# Patient Record
Sex: MALE | Race: WHITE
[De-identification: names, ages, dates, MRNs, and addresses within clinical notes are randomized per-mention and may not be internally consistent; named-entity substitution may affect disease eponyms.]

---

## 2017-01-02 ENCOUNTER — HOSPITAL ENCOUNTER (OUTPATIENT)
Dept: HOSPITAL 58 - RAD | Age: 57
Discharge: HOME | End: 2017-01-02
Attending: NURSE PRACTITIONER

## 2017-01-02 VITALS — BODY MASS INDEX: 26.6 KG/M2

## 2017-01-02 DIAGNOSIS — M54.2: Primary | ICD-10-CM

## 2017-01-02 DIAGNOSIS — E78.2: ICD-10-CM

## 2017-01-02 DIAGNOSIS — Z86.79: ICD-10-CM

## 2017-01-02 DIAGNOSIS — Z12.5: ICD-10-CM

## 2017-01-02 DIAGNOSIS — E04.2: ICD-10-CM

## 2017-01-02 LAB
ALBUMIN SERPL-MCNC: 3.9 G/DL (ref 3.4–5)
ALBUMIN/GLOB SERPL: 1.03 {RATIO}
ALP SERPL-CCNC: 94 U/L (ref 50–136)
ALT SERPL-CCNC: 35 U/L (ref 12–78)
ANION GAP SERPL CALC-SCNC: 14.8 MMOL/L
AST SERPL-CCNC: 21 U/L (ref 15–37)
BASOPHILS # BLD AUTO: 0 K/UL (ref 0–0.2)
BASOPHILS NFR BLD AUTO: 0.4 % (ref 0–3)
BILIRUB SERPL-MCNC: 0.34 MG/DL (ref 0–1.2)
BUN SERPL-MCNC: 8 MG/DL (ref 7–18)
BUN/CREAT SERPL: 9.3
CALCIUM SERPL-MCNC: 9.9 MG/DL (ref 8.2–10.2)
CHLORIDE SERPL-SCNC: 104 MMOL/L (ref 98–107)
CHOLEST SERPL-MCNC: 192 MG/DL (ref 0–200)
CHOLEST/HDLC SERPL: 5.6 {RATIO} (ref 4.5–6.4)
CO2 BLD-SCNC: 26 MMOL/L (ref 21–32)
CREAT SERPL-MCNC: 0.86 MG/DL (ref 0.6–1.1)
EOSINOPHIL # BLD AUTO: 0.4 K/UL (ref 0–0.7)
EOSINOPHIL NFR BLD AUTO: 4.8 % (ref 0–7)
GFR SERPLBLD BASED ON 1.73 SQ M-ARVRAT: 91 ML/MIN
GLOBULIN SER CALC-MCNC: 3.8 G/L
GLUCOSE SERPL-MCNC: 102 MG/DL (ref 70–100)
HCT VFR BLD AUTO: 39.7 % (ref 42–52)
HDLC SERPL-MCNC: 34 MG/DL (ref 35–60)
HGB BLD-MCNC: 13.8 G/DL (ref 14–18)
IMM GRANULOCYTES NFR BLD AUTO: 0.2 % (ref 0–5)
LYMPHOCYTES # SPEC AUTO: 2.8 K/UL (ref 0.6–3.4)
LYMPHOCYTES NFR BLD AUTO: 31.8 % (ref 10–50)
MCH RBC QN: 33.1 PG (ref 27–31)
MCHC RBC AUTO-ENTMCNC: 34.8 G/DL (ref 31.8–35.4)
MCV RBC: 95.2 FL (ref 80–94)
MONOCYTES # BLD AUTO: 0.7 K/UL (ref 0.4–2)
MONOCYTES NFR BLD AUTO: 8.2 % (ref 0–10)
NEUTROPHILS # BLD AUTO: 4.9 K/UL (ref 2–6.9)
NEUTROPHILS NFR BLD AUTO: 54.6 %
PLATELET # BLD AUTO: 350 10^3/UL (ref 140–440)
POTASSIUM SERPL-SCNC: 3.8 MMOL/L (ref 3.5–5.1)
PROT SERPL-MCNC: 7.7 G/DL (ref 6.4–8.2)
RBC # BLD AUTO: 4.17 10^6/UL (ref 4.7–6.1)
SODIUM SERPL-SCNC: 141 MMOL/L (ref 136–145)
TRIGL SERPL-MCNC: 266 MG/DL (ref 30–150)
VLDLC SERPL CALC-MCNC: 53 MG/DL (ref 2–30)
WBC # BLD AUTO: 8.91 K/UL (ref 4.2–10.2)

## 2017-01-02 PROCEDURE — 84443 ASSAY THYROID STIM HORMONE: CPT

## 2017-01-02 PROCEDURE — 80061 LIPID PANEL: CPT

## 2017-01-02 PROCEDURE — 80053 COMPREHEN METABOLIC PANEL: CPT

## 2017-01-02 PROCEDURE — 85025 COMPLETE CBC W/AUTO DIFF WBC: CPT

## 2017-01-02 PROCEDURE — 36415 COLL VENOUS BLD VENIPUNCTURE: CPT

## 2017-01-02 NOTE — US
EXAM:  Carotid ultrasound 

  

HISTORY:  Cervicalgia 

  

COMPARISON:  None 

  

TECHNIQUE:  Carotid ultrasound was performed using gray scale, color, and Doppler imaging was perfor
med. 

  

FINDINGS: 

Right carotid:  There is atherosclerotic plaque in the common carotid artery and bulb/proximal inter
nal carotid artery.  Peak systolic velocity measurement in the right internal carotid artery is 0.8 
meters per second.  End-diastolic velocity measurement in the right internal carotid artery is 0.3 m
eters per second.  Right internal to common carotid artery peak systolic velocity ratio is 1.1.  Zeus
w in the right vertebral artery is antegrade. 

  

Left carotid:  There is atherosclerotic plaque in the common carotid artery and bulb/proximal intern
al carotid artery.  Peak systolic velocity measurement in the left internal carotid artery is 0.6 me
ters per second.  End-diastolic velocity measurement in the left internal carotid artery is 0.3 mete
rs per second.  Left internal to common carotid artery peak systolic velocity ratio measures 0.7.  F
low in the left vertebral artery is antegrade. 

  

IMPRESSION: 

1. Right internal carotid: Mild (less than 50%) stenosis 

2. Left internal carotid: Mild (less than 50%) stenosis

## 2017-04-13 DIAGNOSIS — I65.23 BILATERAL CAROTID ARTERY STENOSIS: Primary | ICD-10-CM

## 2017-05-10 ENCOUNTER — OFFICE VISIT (OUTPATIENT)
Dept: VASCULAR SURGERY | Facility: CLINIC | Age: 57
End: 2017-05-10

## 2017-05-10 ENCOUNTER — HOSPITAL ENCOUNTER (OUTPATIENT)
Dept: ULTRASOUND IMAGING | Facility: HOSPITAL | Age: 57
Discharge: HOME | End: 2017-05-10
Admitting: NURSE PRACTITIONER

## 2017-05-10 VITALS
WEIGHT: 177 LBS | HEART RATE: 82 BPM | BODY MASS INDEX: 27.78 KG/M2 | SYSTOLIC BLOOD PRESSURE: 162 MMHG | DIASTOLIC BLOOD PRESSURE: 98 MMHG | HEIGHT: 67 IN

## 2017-05-10 DIAGNOSIS — I65.23 BILATERAL CAROTID ARTERY STENOSIS: ICD-10-CM

## 2017-05-10 DIAGNOSIS — I65.23 BILATERAL CAROTID ARTERY STENOSIS: Primary | ICD-10-CM

## 2017-05-10 PROCEDURE — 93880 EXTRACRANIAL BILAT STUDY: CPT | Performed by: SURGERY

## 2017-05-10 PROCEDURE — 99214 OFFICE O/P EST MOD 30 MIN: CPT | Performed by: NURSE PRACTITIONER

## 2017-05-10 PROCEDURE — 93880 EXTRACRANIAL BILAT STUDY: CPT

## 2017-05-10 RX ORDER — MELOXICAM 15 MG/1
TABLET ORAL
Refills: 1 | COMMUNITY
Start: 2017-03-30

## 2017-05-15 ENCOUNTER — HOSPITAL ENCOUNTER (OUTPATIENT)
Dept: HOSPITAL 58 - LAB | Age: 57
Discharge: HOME | End: 2017-05-15
Attending: SPECIALIST

## 2017-05-15 VITALS — BODY MASS INDEX: 26.6 KG/M2

## 2017-05-15 DIAGNOSIS — E04.2: Primary | ICD-10-CM

## 2017-05-15 LAB
ALBUMIN SERPL-MCNC: 3.5 G/DL (ref 3.4–5)
ALBUMIN/GLOB SERPL: 0.92 {RATIO}
ALP SERPL-CCNC: 73 U/L (ref 50–136)
ALT SERPL-CCNC: 32 U/L (ref 12–78)
ANION GAP SERPL CALC-SCNC: 13.3 MMOL/L
AST SERPL-CCNC: 20 U/L (ref 15–37)
BASOPHILS # BLD AUTO: 0.1 K/UL (ref 0–0.2)
BASOPHILS NFR BLD AUTO: 0.8 % (ref 0–3)
BILIRUB SERPL-MCNC: 0.27 MG/DL (ref 0–1.2)
BUN SERPL-MCNC: 14 MG/DL (ref 7–18)
BUN/CREAT SERPL: 19.44
CALCIUM SERPL-MCNC: 8.9 MG/DL (ref 8.2–10.2)
CHLORIDE SERPL-SCNC: 109 MMOL/L (ref 98–107)
CHOLEST SERPL-MCNC: 215 MG/DL (ref 0–200)
CHOLEST/HDLC SERPL: 6.7 {RATIO} (ref 4.5–6.4)
CO2 BLD-SCNC: 22 MMOL/L (ref 21–32)
CREAT SERPL-MCNC: 0.72 MG/DL (ref 0.6–1.1)
EOSINOPHIL # BLD AUTO: 0.4 K/UL (ref 0–0.7)
EOSINOPHIL NFR BLD AUTO: 4.1 % (ref 0–7)
GFR SERPLBLD BASED ON 1.73 SQ M-ARVRAT: 112 ML/MIN
GLOBULIN SER CALC-MCNC: 3.8 G/L
GLUCOSE SERPL-MCNC: 86 MG/DL (ref 70–100)
HCT VFR BLD AUTO: 39.9 % (ref 42–52)
HDLC SERPL-MCNC: 32 MG/DL (ref 35–60)
HGB BLD-MCNC: 14 G/DL (ref 14–18)
IMM GRANULOCYTES NFR BLD AUTO: 0.6 % (ref 0–5)
LYMPHOCYTES # SPEC AUTO: 2.4 K/UL (ref 0.6–3.4)
LYMPHOCYTES NFR BLD AUTO: 27.3 % (ref 10–50)
MCH RBC QN: 33.3 PG (ref 27–31)
MCHC RBC AUTO-ENTMCNC: 35.1 G/DL (ref 31.8–35.4)
MCV RBC: 94.8 FL (ref 80–94)
MONOCYTES # BLD AUTO: 0.7 K/UL (ref 0.4–2)
MONOCYTES NFR BLD AUTO: 7.5 % (ref 0–10)
NEUTROPHILS # BLD AUTO: 5.2 K/UL (ref 2–6.9)
NEUTROPHILS NFR BLD AUTO: 59.7 %
PLATELET # BLD AUTO: 426 10^3/UL (ref 140–440)
POTASSIUM SERPL-SCNC: 3.3 MMOL/L (ref 3.5–5.1)
PROT SERPL-MCNC: 7.3 G/DL (ref 6.4–8.2)
RBC # BLD AUTO: 4.21 10^6/UL (ref 4.7–6.1)
SODIUM SERPL-SCNC: 141 MMOL/L (ref 136–145)
TRIGL SERPL-MCNC: 415 MG/DL (ref 30–150)
WBC # BLD AUTO: 8.63 K/UL (ref 4.2–10.2)

## 2017-05-15 PROCEDURE — 80053 COMPREHEN METABOLIC PANEL: CPT

## 2017-05-15 PROCEDURE — 84439 ASSAY OF FREE THYROXINE: CPT

## 2017-05-15 PROCEDURE — 80061 LIPID PANEL: CPT

## 2017-05-15 PROCEDURE — 36415 COLL VENOUS BLD VENIPUNCTURE: CPT

## 2017-05-15 PROCEDURE — 84443 ASSAY THYROID STIM HORMONE: CPT

## 2017-05-15 PROCEDURE — 85025 COMPLETE CBC W/AUTO DIFF WBC: CPT

## 2017-06-19 ENCOUNTER — HOSPITAL ENCOUNTER (OUTPATIENT)
Dept: HOSPITAL 58 - RAD | Age: 57
Discharge: HOME | End: 2017-06-19
Attending: PAIN MEDICINE

## 2017-06-19 VITALS — BODY MASS INDEX: 26.6 KG/M2

## 2017-06-19 DIAGNOSIS — M47.816: Primary | ICD-10-CM

## 2017-06-19 DIAGNOSIS — M48.06: ICD-10-CM

## 2017-06-19 DIAGNOSIS — M51.36: ICD-10-CM

## 2017-06-19 NOTE — MRI
EXAM:  MRI lumbar spine without IV contrast.    DATE: 06/19/2017. 

  

HISTORY:  Facet joint arthropathy, spinal stenosis. 

  

TECHNIQUE:  Sagittal and axial T1W and T2W sequences of the lumbar spine along with sagittal IR and 
coronal T2W sequences were obtained using 1.2 Rosa Elena magnet.  No IV contrast. 

  

COMPARISON:  MRI L-spine 2 March 2015. 

  

FINDINGS:  There are five non-rib-bearing lumbar vertebra.  Twelfth ribs appear somewhat small.  The
re is no lumbar scoliosis.  No acute lumbar fracture, subluxation, osseous malignancy, or pars inter
articularis defect is demonstrated.  Lumbar vertebra are normal in height.  Chronic Schmorl's nodes 
are identified at T10, T11, T12, and L1.  Bone marrow signal is overall normal, except for significa
nt degenerative changes at T11-12.  Prominent osteophytes are noted at T11-12 and T12-L1, with small
er osteophytes at several lumbar levels.  Mild L3-4, minor L4-5, and moderate L5-S1 disc space narro
wing is detected.  No sacral fracture or stress reaction is evident.  Mild SI joint arthritis (left 
greater than right) is observed.  A T2W bright, T1W dark, 7.9 x 4.7 x 5.5 mm focus at the left naina
idline S3 level is likely a Tarlov cyst.  Conus medullaris terminates at T12-L1.  Visible spinal cor
d is normal. 

  

No retroperitoneal lymphadenopathy, paraspinal mass, or aortic aneurysm is identified.  Paraspinal m
usculature is symmetric bilaterally.  Visible portions of the liver, spleen, right adrenal gland and
 right kidney are unremarkable.  A subtle T2W bright area in the anterolateral cortex midzone left k
idney may be volume averaging with the renal sinus complex or this cyst, but is not fully visualized
.  The left adrenal body appears somewhat nodular; however, this may be breathing motion artifact.  
No bowel obstruction or neoplasm is evident.  A few sigmoid colon diverticuli are suspected. 

  

Segmental analysis: 

  

T11-12:  Sagittal images reveal minor posterior disc bulge without cord compression or central steno
sis.  Each foramen is patent. 

  

T12-L1:  Minimal posterior disc bulge does not cause conus compression, central stenosis or foramina
l stenosis. 

  

L1-2:  Minimal midline right paracentral disc bulge does not cause central stenosis or foraminal oni
nosis. 

  

L2-3:  Minimal concentric disc bulge causes minor bilateral inferior foraminal encroachment.  No gabi
tral canal stenosis. 

  

L3-4:  Small concentric disc bulge, mild right facet arthropathy, moderate left facet arthropathy, a
nd mild ligamentum flavum hypertrophy cause mild central canal stenosis and minor narrowing at the o
pening to each foramen. 

  

L4-5:  Minimal concentric disc bulge, mild facet arthropathy, and mild ligamentum flavum hypertrophy
 cause mild bilateral foraminal stenoses.  No central canal stenosis. Each L4 nerve root appears to 
contact the disc bulge near the lateral margin of the foramen. 

  

L5-S1:  Minimal posterior disc bulge and mild left facet arthropathy cause slight left foraminal alyssia
rowing.  No central canal stenosis. 

  

  

IMPRESSIONS: 

  

1.  Thoracolumbar mild spondylosis, mild/moderate facet arthropathy, and multilevel DDD - - similar 
to March 2015. 

2.  Multilevel foraminal narrowing (minor/mild).  Each L4 nerve root contacts the disc bulge near th
e foramen, and could be sources for pain/radiculopathy. 

3.  Mild central canal stenosis at L3-4. 

4.  T-L-spine chronic Schmorl's nodes. 

5.  Probable Tarlov cyst at S3 canal level. 

6.  Minor sigmoid colon diverticulosis. 

7.  Left adrenal artifact vs small lesion (7.8 mm).  If this is a true lesion, it is statistically l
ikely to be a benign adenoma.

## 2017-07-11 ENCOUNTER — OFFICE VISIT (OUTPATIENT)
Dept: NEUROSURGERY | Facility: CLINIC | Age: 57
End: 2017-07-11

## 2017-07-11 VITALS
DIASTOLIC BLOOD PRESSURE: 75 MMHG | BODY MASS INDEX: 28.25 KG/M2 | WEIGHT: 180 LBS | SYSTOLIC BLOOD PRESSURE: 110 MMHG | HEIGHT: 67 IN

## 2017-07-11 DIAGNOSIS — M48.061 SPINAL STENOSIS, LUMBAR REGION, WITHOUT NEUROGENIC CLAUDICATION: Primary | ICD-10-CM

## 2017-07-11 DIAGNOSIS — Z78.9 NONSMOKER: ICD-10-CM

## 2017-07-11 DIAGNOSIS — E66.3 OVERWEIGHT: ICD-10-CM

## 2017-07-11 PROCEDURE — 99204 OFFICE O/P NEW MOD 45 MIN: CPT | Performed by: NURSE PRACTITIONER

## 2017-07-11 RX ORDER — LISINOPRIL 10 MG/1
TABLET ORAL
Refills: 6 | COMMUNITY
Start: 2017-06-13

## 2017-07-11 RX ORDER — METHYLPREDNISOLONE 4 MG/1
TABLET ORAL
Qty: 21 EACH | Refills: 0 | Status: SHIPPED | OUTPATIENT
Start: 2017-07-11 | End: 2017-09-07

## 2017-07-11 RX ORDER — ATORVASTATIN CALCIUM 20 MG/1
TABLET, FILM COATED ORAL
Refills: 6 | COMMUNITY
Start: 2017-05-15 | End: 2017-07-11

## 2017-07-11 RX ORDER — ATORVASTATIN CALCIUM 40 MG/1
TABLET, FILM COATED ORAL
Refills: 6 | COMMUNITY
Start: 2017-05-17

## 2017-07-11 RX ORDER — BUPROPION HYDROCHLORIDE 100 MG/1
TABLET, EXTENDED RELEASE ORAL
Refills: 3 | COMMUNITY
Start: 2017-06-14

## 2017-07-11 NOTE — PROGRESS NOTES
Chief complaint:   Chief Complaint   Patient presents with   • Back Pain     Patient has back pain with right leg pain that goes down to his knee, Jos has not tried any physical therapy for his back pain.        Subjective     HPI: This is a 56-year-old male gentleman who is referred to us for low back pain and right lower extremity pain.  He is here to be evaluated today.  He states that the pain in his back is been going on for several years but it did get significantly worse about a year and a half ago.  There is no accident or injury associated with this.  He states the pain in his back is constant.  It is a dull aching pain.  He says he can get some relief when he standing or laying down.  He says sitting will make his pain worse.  He will have pain that radiates down into his right lower extremity in a posterior radicular fashion down to about his knee.  He says this pain is intermittent.  Its better if he can leaning get the pressure off of his leg in its worse when he sitting down.  He denies any bowel or bladder issues.  He has not done any dedicated course of physical therapy, chiropractic care, or pain management injections.  He rates the pain on a scale 0-10 at a 4.  He says it is interfering with his activities of daily living.  He currently is taking anti-inflammatory medication.  He is currently retired.    Review of Systems   Eyes:        Wears glasses   Cardiovascular: Positive for chest pain.   Musculoskeletal: Positive for back pain, joint swelling and neck pain.   Neurological: Positive for headaches.   Psychiatric/Behavioral: The patient is nervous/anxious.    All other systems reviewed and are negative.       Past Medical History:   Diagnosis Date   • Subclavian steal syndrome      Past Surgical History:   Procedure Laterality Date   • CAROTID SUBCLAVIAN BYPASS     • CERVICAL FUSION     • HYDROCELE EXCISION / REPAIR     • NECK SURGERY       Family History   Problem Relation Age of Onset  "  • Stroke Other    • Heart disease Other    • Heart disease Mother    • Hypertension Mother    • Stroke Father    • No Known Problems Sister    • No Known Problems Brother    • No Known Problems Sister      Social History   Substance Use Topics   • Smoking status: Former Smoker     Quit date: 11/22/2015   • Smokeless tobacco: Never Used   • Alcohol use No       (Not in a hospital admission)  Allergies:  Review of patient's allergies indicates no known allergies.    Objective      Vital Signs  /75 (BP Location: Right arm, Patient Position: Sitting)  Ht 67\" (170.2 cm)  Wt 180 lb (81.6 kg)  BMI 28.19 kg/m2    Physical Exam   Constitutional: He is oriented to person, place, and time. He appears well-developed and well-nourished.   HENT:   Head: Normocephalic.   Eyes: Conjunctivae, EOM and lids are normal. Pupils are equal, round, and reactive to light.   Neck: Normal range of motion.   Cardiovascular: Normal rate, regular rhythm and normal heart sounds.    Pulmonary/Chest: Effort normal and breath sounds normal.   Abdominal: Normal appearance.   Musculoskeletal: Normal range of motion.        Lumbar back: He exhibits pain.   Neurological: He is alert and oriented to person, place, and time. He has normal strength and normal reflexes. He displays normal reflexes. No cranial nerve deficit or sensory deficit. GCS eye subscore is 4. GCS verbal subscore is 5. GCS motor subscore is 6.   Skin: Skin is warm.   Psychiatric: He has a normal mood and affect. His speech is normal and behavior is normal. Thought content normal. Cognition and memory are normal.       Results Review: MRI lumbar spine shows the patient does have disc bulging at L3-4.  He does have bilateral foraminal narrowing at this level to him mild-to-moderate degree as well as at L4-5.  No malalignment visualized.  Mild disc degeneration noted.  No Modic endplate changes.  No fracture visualized.          Assessment/Plan: At this point were in get the " patient started into a dedicated course of physical therapy consisting of 2-3 times a week for 4-6 weeks.  In addition of that I am is start him on a Medrol Dosepak to see this will help with the pain that he is experiencing.  We will follow-up with him in 6-8 weeks which time we will see Dr. Le.  Should he not have any improvement from the therapy and steroids he may be a good candidate for injections.  BMI shows that is overweight.  BMI chart was given the patient.  He is a nonsmoker.      Jos was seen today for back pain.    Diagnoses and all orders for this visit:    Spinal stenosis, lumbar region, without neurogenic claudication  -     Ambulatory Referral to Physical Therapy Evaluate and treat    Overweight    Nonsmoker    Other orders  -     SCANNED - IMAGING  -     MethylPREDNISolone (MEDROL, DIAMOND,) 4 MG tablet; Take as directed on package instructions.          I discussed the patients findings and my recommendations with patient    Jose Brown, BONITA  07/11/17  12:17 PM

## 2017-09-06 ENCOUNTER — TELEPHONE (OUTPATIENT)
Dept: NEUROSURGERY | Facility: CLINIC | Age: 57
End: 2017-09-06

## 2017-09-06 NOTE — TELEPHONE ENCOUNTER
Neither # in the system has a voicemail set up and no one answered at either #.    carrie reynoso CMA      Patient called back & confirmed his appt for tomorrow/carrie reynoso CMA

## 2017-09-07 ENCOUNTER — OFFICE VISIT (OUTPATIENT)
Dept: NEUROSURGERY | Facility: CLINIC | Age: 57
End: 2017-09-07

## 2017-09-07 VITALS
HEIGHT: 67 IN | BODY MASS INDEX: 28.25 KG/M2 | DIASTOLIC BLOOD PRESSURE: 76 MMHG | WEIGHT: 180 LBS | SYSTOLIC BLOOD PRESSURE: 124 MMHG

## 2017-09-07 DIAGNOSIS — M48.061 SPINAL STENOSIS, LUMBAR REGION, WITHOUT NEUROGENIC CLAUDICATION: Primary | ICD-10-CM

## 2017-09-07 DIAGNOSIS — Z78.9 NONSMOKER: ICD-10-CM

## 2017-09-07 PROBLEM — E66.3 OVERWEIGHT: Status: RESOLVED | Noted: 2017-07-11 | Resolved: 2017-09-07

## 2017-09-07 PROCEDURE — 99213 OFFICE O/P EST LOW 20 MIN: CPT | Performed by: NEUROLOGICAL SURGERY

## 2017-09-07 NOTE — PROGRESS NOTES
SUBJECTIVE:  Patient ID: Jos Pfeiffer is a 56 y.o. male is here today for follow-up.    Chief Complaint: Back pain  Chief Complaint   Patient presents with   • back & leg pain     patient has completed 8 PT sessions without any significant relief of his symptoms       HPI  56-year-old gentleman with a long-term history of back pain.  He has been working with [facility] pain management for a few years now.  He complains of back pain and some bilateral hip pain.  It is claudicate in nature.  He did 8 sessions of therapy with out any improvement.  He is managing his pain with Norco 3 times a day.  He has had no pain management injections.    The following portions of the patient's history were reviewed and updated as appropriate: allergies, current medications, past family history, past medical history, past social history, past surgical history and problem list.    OBJECTIVE:    Review of Systems   Musculoskeletal: Positive for back pain.   All other systems reviewed and are negative.         Physical Exam   Constitutional: He is oriented to person, place, and time. He appears well-developed and well-nourished.   HENT:   Head: Normocephalic and atraumatic.   Right Ear: Hearing normal.   Left Ear: Hearing normal.   Eyes: EOM are normal. Pupils are equal, round, and reactive to light.   Neck: Normal range of motion.   Neurological: He is alert and oriented to person, place, and time. He has normal strength and normal reflexes. No cranial nerve deficit or sensory deficit. He displays a negative Romberg sign. GCS eye subscore is 4. GCS verbal subscore is 5. GCS motor subscore is 6. He displays no Babinski's sign on the right side. He displays no Babinski's sign on the left side.   Psychiatric: His speech is normal. Judgment normal. Cognition and memory are normal.       Neurologic Exam     Mental Status   Oriented to person, place, and time.   Speech: speech is normal     Cranial Nerves     CN III, IV, VI   Pupils are  equal, round, and reactive to light.  Extraocular motions are normal.     Motor Exam     Strength   Strength 5/5 throughout.       Independent Review of Radiographic Studies:   MRI of the lumbar spine demonstrates only mild degenerative changes with no significant neuroforaminal compromise or compression.  ASSESSMENT/PLAN:  The patient does not require any sort of surgical intervention in his low back.  I encouraged him to continue work with [facility] and consider starting a series of epidural injections.      1. Spinal stenosis, lumbar region, without neurogenic claudication    2. BMI 28.0-28.9,adult    3. Nonsmoker            No Follow-up on file.      Rogelio Le MD

## 2017-09-07 NOTE — PATIENT INSTRUCTIONS

## 2017-11-14 ENCOUNTER — HOSPITAL ENCOUNTER (OUTPATIENT)
Dept: HOSPITAL 58 - LAB | Age: 57
Discharge: HOME | End: 2017-11-14
Attending: NURSE PRACTITIONER

## 2017-11-14 VITALS — BODY MASS INDEX: 26.6 KG/M2

## 2017-11-14 DIAGNOSIS — E78.2: ICD-10-CM

## 2017-11-14 DIAGNOSIS — M54.9: Primary | ICD-10-CM

## 2017-11-14 DIAGNOSIS — G89.29: ICD-10-CM

## 2017-11-14 LAB
ALBUMIN SERPL-MCNC: 3.6 G/DL (ref 3.4–5)
ALBUMIN/GLOB SERPL: 0.92 {RATIO}
ALP SERPL-CCNC: 107 U/L (ref 50–136)
ALT SERPL-CCNC: 32 U/L (ref 12–78)
ANION GAP SERPL CALC-SCNC: 11.6 MMOL/L
AST SERPL-CCNC: 22 U/L (ref 15–37)
BASOPHILS # BLD AUTO: 0.1 K/UL (ref 0–0.2)
BASOPHILS NFR BLD AUTO: 0.6 % (ref 0–3)
BILIRUB SERPL-MCNC: 0.42 MG/DL (ref 0–1.2)
BUN SERPL-MCNC: 7 MG/DL (ref 7–18)
BUN/CREAT SERPL: 8.75
CALCIUM SERPL-MCNC: 9.7 MG/DL (ref 8.2–10.2)
CHLORIDE SERPL-SCNC: 106 MMOL/L (ref 98–107)
CHOLEST SERPL-MCNC: 161 MG/DL (ref 0–200)
CHOLEST/HDLC SERPL: 4.2 {RATIO} (ref 4.5–6.4)
CO2 BLD-SCNC: 25 MMOL/L (ref 21–32)
CREAT SERPL-MCNC: 0.8 MG/DL (ref 0.6–1.1)
EOSINOPHIL # BLD AUTO: 0.3 K/UL (ref 0–0.7)
EOSINOPHIL NFR BLD AUTO: 3.3 % (ref 0–7)
GFR SERPLBLD BASED ON 1.73 SQ M-ARVRAT: 99 ML/MIN
GLOBULIN SER CALC-MCNC: 3.9 G/L
GLUCOSE SERPL-MCNC: 108 MG/DL (ref 70–100)
HCT VFR BLD AUTO: 37.7 % (ref 42–52)
HDLC SERPL-MCNC: 38 MG/DL (ref 35–60)
HGB BLD-MCNC: 13.6 G/DL (ref 14–18)
IMM GRANULOCYTES NFR BLD AUTO: 0.3 % (ref 0–5)
LYMPHOCYTES # SPEC AUTO: 2.1 K/UL (ref 0.6–3.4)
LYMPHOCYTES NFR BLD AUTO: 23.1 % (ref 10–50)
MCH RBC QN: 33.8 PG (ref 27–31)
MCHC RBC AUTO-ENTMCNC: 36.1 G/DL (ref 31.8–35.4)
MCV RBC: 93.8 FL (ref 80–94)
MONOCYTES # BLD AUTO: 0.7 K/UL (ref 0.4–2)
MONOCYTES NFR BLD AUTO: 7.7 % (ref 0–10)
NEUTROPHILS # BLD AUTO: 5.8 K/UL (ref 2–6.9)
NEUTROPHILS NFR BLD AUTO: 65 %
PLATELET # BLD AUTO: 326 10^3/UL (ref 140–440)
POTASSIUM SERPL-SCNC: 3.6 MMOL/L (ref 3.5–5.1)
PROT SERPL-MCNC: 7.5 G/DL (ref 6.4–8.2)
RBC # BLD AUTO: 4.02 10^6/UL (ref 4.7–6.1)
SODIUM SERPL-SCNC: 139 MMOL/L (ref 136–145)
TRIGL SERPL-MCNC: 183 MG/DL (ref 30–150)
VLDLC SERPL CALC-MCNC: 37 MG/DL (ref 2–30)
WBC # BLD AUTO: 8.92 K/UL (ref 4.2–10.2)

## 2017-11-14 PROCEDURE — 80053 COMPREHEN METABOLIC PANEL: CPT

## 2017-11-14 PROCEDURE — 36415 COLL VENOUS BLD VENIPUNCTURE: CPT

## 2017-11-14 PROCEDURE — 85025 COMPLETE CBC W/AUTO DIFF WBC: CPT

## 2017-11-14 PROCEDURE — 80061 LIPID PANEL: CPT

## 2017-12-06 ENCOUNTER — HOSPITAL ENCOUNTER (OUTPATIENT)
Dept: HOSPITAL 58 - RAD | Age: 57
Discharge: HOME | End: 2017-12-06
Attending: NURSE PRACTITIONER

## 2017-12-06 VITALS — BODY MASS INDEX: 26.6 KG/M2

## 2017-12-06 DIAGNOSIS — R06.02: ICD-10-CM

## 2017-12-06 DIAGNOSIS — R07.89: Primary | ICD-10-CM

## 2017-12-06 NOTE — CT
EXAM:  CT of the chest with and without contrast 

  

History:  Chest wall discomfort, short of breath 

  

Comparison:  Chest radiograph 03/17/2016, chest CT 01/21/2016 

  

Technique:  Multiplanar CT images through the thorax were obtained with and without the administratio
n of IV contrast 

  

Findings: Heart size is normal.  Coronary calcifications.  No pericardial effusion.  No pathologicall
y enlarged thoracic lymph nodes.  Great vessels are unremarkable.  No consolidation.  No pleural flui
d and no pneumothorax. Dependent atelectasis.  No suspicious lung masses or lung nodules. 

  

Within the visualized upper abdomen, no acute findings.  Stable partial calcification of the right ad
renal gland most compatible with old trauma. No acute osseous abnormalities.  Moderate to severe dege
nerative disc disease at T11-T12. 

  

Impression: 

1.  No acute intrathoracic process. 

2.  Coronary artery disease. 

3.  Moderate to severe degenerative disc disease at T11-T12

## 2017-12-11 ENCOUNTER — HOSPITAL ENCOUNTER (OUTPATIENT)
Dept: HOSPITAL 58 - CAR | Age: 57
Discharge: HOME | End: 2017-12-11
Attending: NURSE PRACTITIONER

## 2017-12-11 VITALS — BODY MASS INDEX: 26.6 KG/M2

## 2017-12-11 DIAGNOSIS — R07.89: Primary | ICD-10-CM

## 2017-12-11 DIAGNOSIS — I25.10: ICD-10-CM

## 2017-12-11 PROCEDURE — 93005 ELECTROCARDIOGRAM TRACING: CPT

## 2017-12-11 PROCEDURE — 93010 ELECTROCARDIOGRAM REPORT: CPT

## 2017-12-11 NOTE — ECHOSTRESS
Date of Exam: 12/11/17   Ordering Physician: KULWINDER HESTER

Reason for Echo: CHEST DISCOMFORT, CORONARY ARTERY DISEASE, HYPERTENSION, 
STRESS TEST POSITIVE FOR ISCHEMIA





M-Mode  Normal Adult Results LV Dimensions Normal Adult Results

 

 AoV Opening excursions       >1.6   LVEDD-base-     3.5-5.8  

 

Ao root dimensions      2.0-3.7   LVESD-base-     3.1-4.6  

 

L. Atrium dimensions      1.9-3.8   Post. Wall thickness     0.8-1.1  

 

IV septum (thickness)      0.7-1.2   Post. Wall excursion     0.72-1.3  

 

 Septal motion    Systolic motion   

 

 R. Ventricular cavity     1.5-2.0    LVEF       60%  

 

Paradoxical septal wall motion       



2-D: NORMAL LEFT VENTRICULAR CONTRACTILITY RESTING/POST EXERCISE 

M-MODE:

MV: 

AV:  

TV:  

PV:  

CHAMBER SIZE: 

WALL MOTION:  NORMAL LEFT VENTRICULAR CONTRACTILITY RESTING/POST EXERCISE

PERICARDIUM:  

_______________________________________________________

INTERPRETATION:  

1. NORMAL LEFT VENTRICULAR CONTRACTILITY RESTING/POST EXERCISE

    RECOMMEND STRESS SESTAMIBI
MTDD

## 2017-12-11 NOTE — STRESSECHO
Date of Test: 12/11/17  Reason for Exam: CHEST DISCOMFORT, CORONARY ARTERY 
DISEASE, HYPERTENSION      Ordering Physician: KULWINDER HESTER

Current Medications: ATORVASTATIN, LISINOPRIL, WELLBUTRIN, LORTAB, ASA, MULTI 
VITAMIN, VITAMIN B12, VITAMIN E

Physical Findings: S1, S2, NO S3   Resting EKG: SINUS RHYTHM, NO ACUTE CHANGES
         Target Heart Rate: 137/162





 STAGE MPH/GRADE HEART RATE BPM BLOOD PRESSURE mmhg RHYTHM S-T SEGMENT

      +/- UP DOWN SYMPTOMS,COMMENTS

 

 At Rest   90  128/78  SR  X   NONE



        

 

 1  1.7/10%  124  152/90  SR  X   NONE



        

 

 2  2.5/12%           



        

 

 3  3.4/14%           



        

 

 4  4.2/16%           



        

 

 5  5.0/18%           



        

 

Immediately after   148  150/88  SR     X SOB



   Durations of Exercise: 5:32   Maximum Heart Rate Reached: 148  Reason for 
Termination: SHORT OF BREATH

   4 Minutes Post Exercise:  HeartRate: 88  Blood Pressure: 130/72, ST-T WAVE -/
+, No Symptoms/Comments  

____________________________________________________________________________   
                                                  INTERPRETATION: OXYGEN SAT 98
% WITH EXERCISE,  METS 7.0

1. TEST POSITIVE FOR ISCHEMIC ST-T WAVE CHANGES

2. NO CHEST PAIN OR DISCOMFORT

3. COUPLE OF PVC'S WITH EXERCISE

4. BLOOD PRESSURE RESPONSE NORMAL

5. NORMAL LEFT VENTRICULAR CONTRACTILITY RESTING/POST EXERCISE.  RECOMMEND 
STRESS SESTAMIBI

MTDD

## 2017-12-12 ENCOUNTER — OFFICE VISIT (OUTPATIENT)
Dept: VASCULAR SURGERY | Facility: CLINIC | Age: 57
End: 2017-12-12

## 2017-12-12 ENCOUNTER — HOSPITAL ENCOUNTER (OUTPATIENT)
Dept: ULTRASOUND IMAGING | Facility: HOSPITAL | Age: 57
Discharge: HOME | End: 2017-12-12
Admitting: NURSE PRACTITIONER

## 2017-12-12 VITALS
DIASTOLIC BLOOD PRESSURE: 82 MMHG | HEART RATE: 92 BPM | HEIGHT: 67 IN | WEIGHT: 172 LBS | SYSTOLIC BLOOD PRESSURE: 130 MMHG | BODY MASS INDEX: 27 KG/M2

## 2017-12-12 DIAGNOSIS — I65.23 BILATERAL CAROTID ARTERY STENOSIS: ICD-10-CM

## 2017-12-12 DIAGNOSIS — I10 ESSENTIAL HYPERTENSION: ICD-10-CM

## 2017-12-12 DIAGNOSIS — I65.23 BILATERAL CAROTID ARTERY STENOSIS: Primary | ICD-10-CM

## 2017-12-12 DIAGNOSIS — E78.5 HYPERLIPIDEMIA, UNSPECIFIED HYPERLIPIDEMIA TYPE: ICD-10-CM

## 2017-12-12 PROCEDURE — 99213 OFFICE O/P EST LOW 20 MIN: CPT | Performed by: NURSE PRACTITIONER

## 2017-12-12 PROCEDURE — 93880 EXTRACRANIAL BILAT STUDY: CPT | Performed by: SURGERY

## 2017-12-12 PROCEDURE — 93880 EXTRACRANIAL BILAT STUDY: CPT

## 2017-12-12 NOTE — PROGRESS NOTES
"2017       Sven Brady MD  [address] [place] [id number]        Jos Pfeiffer  1960    Chief Complaint   Patient presents with   • Follow-up     Follow up for carotid artery study results.       Dear Sven Brady MD:    HPI     I had the pleasure of seeing you patient in the office today for follow up.  As you recall, the patient is a 56 y.o. male who Had a history of dizziness and was noted to have a left carotid bruit with retrograde flow in the left vertebral artery.  He was evaluated and eventually sent to Dr.Udaya Plasencia in [place].  He did have a left carotid subclavian bypass on 2016.  Since his surgery he has had continued chest pain and tenderness at the surgical site along with numbness.  He states he does have some pain down both arms more on the left.  When he was initially seen he was having chest pains without shortness of breath, but did have a  at that time.  He has had continued pain and did follow-up with the surgeons and nurse practitioner who noted everything was okay from surgical standpoint, however the patient did not feel comfortable with this.  He is adamant that this bypass be removed.  He did have noninvasive testing performed today, which I did review in office.      /82  Pulse 92  Ht 170.2 cm (67\")  Wt 78 kg (172 lb)  BMI 26.94 kg/m2  Physical Exam   Constitutional: He is oriented to person, place, and time. He appears well-developed and well-nourished. No distress.   HENT:   Head: Normocephalic and atraumatic.   Mouth/Throat: Oropharynx is clear and moist and mucous membranes are normal.   Eyes: Pupils are equal, round, and reactive to light. No scleral icterus.   Neck: No JVD present. Carotid bruit is not present.   Left neck tenderness and tenderness to the left upper chest clavicle area from bypass.   Cardiovascular: Normal rate, regular rhythm, S1 normal, S2 normal, normal heart sounds, intact distal pulses and normal pulses.  Exam " reveals no gallop and no friction rub.    No murmur heard.  Pulses:       Femoral pulses are 2+ on the right side, and 2+ on the left side.       Popliteal pulses are 2+ on the right side, and 2+ on the left side.        Dorsalis pedis pulses are 2+ on the right side, and 2+ on the left side.        Posterior tibial pulses are 2+ on the right side, and 2+ on the left side.   Pulmonary/Chest: Effort normal and breath sounds normal.   Abdominal: Soft. Normal appearance, normal aorta and bowel sounds are normal. He exhibits no abdominal bruit. There is no hepatosplenomegaly. There is no tenderness.   Musculoskeletal: Normal range of motion.       Vascular Status -  His exam exhibits no right foot edema. His exam exhibits no left foot edema.  Neurological: He is alert and oriented to person, place, and time. He has normal strength. No cranial nerve deficit.   Skin: Skin is warm, dry and intact. He is not diaphoretic.   Psychiatric: He has a normal mood and affect. His behavior is normal. Judgment and thought content normal. Cognition and memory are normal.   Vitals reviewed.      DIAGNOSTIC DATA:  Noninvasive testing including a carotid duplex shows less than 50% stenosis and patent left subclavian bypass with bilateral antegrade vertebral flow.      Patient Active Problem List   Diagnosis   • Spinal stenosis, lumbar region, without neurogenic claudication   • Nonsmoker   • BMI 28.0-28.9,adult   • Hypertension   • Hyperlipidemia   • Subclavian steal syndrome         ICD-10-CM ICD-9-CM   1. Bilateral carotid artery stenosis I65.23 433.10     433.30   2. Hyperlipidemia, unspecified hyperlipidemia type E78.5 272.4   3. Essential hypertension I10 401.9       Lab Frequency Next Occurrence   US carotid bilateral Once 9/1/2016       PLAN: After thoroughly evaluating Jos WESLY Pfeiffer, I believe the best course of action is to remain conservative from a vascular standpoint.  We will see Jos Pfeiffer back in 1 year with  repeat noninvasive testing for continued surveillance.  I did discuss vascular risk factors as they pertain to the progression of vascular disease including controlling his blood pressure and cholesterol.  The patient is to continue taking their medications as previously discussed.   This was all discussed in full with complete understanding.    Thank you for allowing me to participate in the care of your patient.  Please do not hesitate to call with any questions or concerns.  We will keep you aware of any further encounters with Jos Pfeiffer.      Sincerely Yours,        BONITA Mccarty

## 2018-01-09 ENCOUNTER — OFFICE VISIT (OUTPATIENT)
Dept: CARDIOLOGY | Facility: CLINIC | Age: 58
End: 2018-01-09

## 2018-01-09 VITALS
HEIGHT: 67 IN | HEART RATE: 76 BPM | SYSTOLIC BLOOD PRESSURE: 132 MMHG | BODY MASS INDEX: 27 KG/M2 | DIASTOLIC BLOOD PRESSURE: 78 MMHG | WEIGHT: 172 LBS

## 2018-01-09 DIAGNOSIS — I10 ESSENTIAL HYPERTENSION: ICD-10-CM

## 2018-01-09 DIAGNOSIS — R06.09 DOE (DYSPNEA ON EXERTION): Primary | ICD-10-CM

## 2018-01-09 DIAGNOSIS — E78.5 HYPERLIPIDEMIA, UNSPECIFIED HYPERLIPIDEMIA TYPE: ICD-10-CM

## 2018-01-09 DIAGNOSIS — Z72.0 TOBACCO ABUSE: ICD-10-CM

## 2018-01-09 PROBLEM — Z78.9 NONSMOKER: Status: RESOLVED | Noted: 2017-07-11 | Resolved: 2018-01-09

## 2018-01-09 PROCEDURE — 99205 OFFICE O/P NEW HI 60 MIN: CPT | Performed by: INTERNAL MEDICINE

## 2018-01-09 PROCEDURE — 93000 ELECTROCARDIOGRAM COMPLETE: CPT | Performed by: INTERNAL MEDICINE

## 2018-01-09 RX ORDER — OMEPRAZOLE 20 MG/1
20 CAPSULE, DELAYED RELEASE ORAL DAILY
COMMUNITY

## 2018-01-09 RX ORDER — NITROGLYCERIN 0.4 MG/1
TABLET SUBLINGUAL
Qty: 100 TABLET | Refills: 11 | Status: SHIPPED | OUTPATIENT
Start: 2018-01-09

## 2018-01-09 NOTE — PROGRESS NOTES
[facility] - CARDIOLOGY  New Patient Initial Outpatient [facility]    Primary Care Physician: Sven Brady MD    Subjective     Chief Complaint: shortness of breath    History of Present Illness  57 year old with shortness of breath x 2 years. Onset: immediate after vascular surgery in January '16. Progression: unchanged. Context: gets severely out of breath with minimal activity. No alleviating factors identified.  Example: walking [employer] up the hill on his driveway produces symptom; takes 4-5 minutes of rest to recover. Has associated palpitations, but no diaphoresis, nausea, or chest discomfort.  Symptoms also aggravated by leaning forward, but in these instances it is accompanied by pressure and nausea that resolves as soon as he leans back.    Review of Systems   Constitution: Negative. Negative for weakness and malaise/fatigue.   HENT: Negative.  Negative for nosebleeds.    Eyes: Negative.    Cardiovascular: Positive for chest pain. Negative for claudication, dyspnea on exertion, irregular heartbeat, leg swelling, near-syncope, orthopnea, palpitations, paroxysmal nocturnal dyspnea and syncope.   Respiratory: Positive for shortness of breath. Negative for cough and wheezing.    Endocrine: Negative.    Hematologic/Lymphatic: Negative.  Negative for bleeding problem. Does not bruise/bleed easily.   Musculoskeletal: Negative.    Gastrointestinal: Positive for heartburn. Negative for dysphagia, hematemesis, hematochezia and melena.   Genitourinary: Negative.  Negative for hematuria and non-menstrual bleeding.   Neurological: Negative.    Psychiatric/Behavioral: Negative.     Otherwise complete ROS reviewed and negative except as mentioned in the HPI.      Past Medical History:   Past Medical History:   Diagnosis Date   • Arthritis    • Chronic back pain    • COPD (chronic obstructive pulmonary disease)    • Hepatitis A    • Hyperlipidemia    • Hypertension    • Subclavian steal syndrome        Past Surgical  "History:  Past Surgical History:   Procedure Laterality Date   • CAROTID SUBCLAVIAN BYPASS     • CERVICAL FUSION     • HYDROCELE EXCISION / REPAIR     • NECK SURGERY         Family History: family history includes Heart disease in his mother and other; Hypertension in his mother; No Known Problems in his brother, sister, and sister; Stroke in his father and other.    Social History:  reports that he has been smoking.  He has been smoking about 1.00 pack per day. He has never used smokeless tobacco. He reports that he does not drink alcohol or use illicit drugs.    Medications:  Prior to Admission medications    Medication Sig Start Date End Date Taking? Authorizing Provider   Albuterol Sulfate (PROAIR HFA IN) Inhale.    Historical Provider, MD   aspirin 325 MG tablet Take 325 mg by mouth Daily.    Historical Provider, MD   atorvastatin (LIPITOR) 40 MG tablet TK 1 T PO D 5/17/17   Historical Provider, MD   buPROPion SR (WELLBUTRIN SR) 100 MG 12 hr tablet TK 1 T PO D 6/14/17   Historical Provider, MD   HYDROcodone-acetaminophen (NORCO)  MG per tablet Take 1 tablet by mouth Every 6 (Six) Hours As Needed for moderate pain (4-6).    Historical Provider, MD   lisinopril (PRINIVIL,ZESTRIL) 10 MG tablet TK 1 T PO D 6/13/17   Historical Provider, MD   meloxicam (MOBIC) 15 MG tablet TK ONE T PO QD 3/30/17   Historical Provider, MD   Multiple Vitamins-Minerals (MULTIVITAMIN ADULT PO) Take  by mouth.    Historical Provider, MD   Tiotropium Bromide Monohydrate (SPIRIVA HANDIHALER IN) Inhale.    Historical Provider, MD     Allergies:  No Known Allergies    Objective     Vital Signs: /78 (BP Location: Right arm, Patient Position: Sitting)  Pulse 76  Ht 170.2 cm (67\")  Wt 78 kg (172 lb)  BMI 26.94 kg/m2    Physical Exam   Constitutional: No distress.   HENT:   Mouth/Throat: Oropharynx is clear. Pharynx is normal.   Neck: Normal range of motion and thyroid normal. Neck supple. No JVD present. No thyromegaly present. " "  Cardiovascular: Normal rate, regular rhythm, S1 normal, S2 normal, intact distal pulses and normal pulses.   No extrasystoles are present. PMI is not displaced.    Murmur heard.   Harsh midsystolic murmur is present with a grade of 2/6  at the upper right sternal border radiating to the neck  Pulses:       Carotid pulses are on the left side with bruit.  Left carotid- subclavian graft with bruit   Pulmonary/Chest: Effort normal and breath sounds normal.   Abdominal: Soft. Bowel sounds are normal. He exhibits no distension. There is no splenomegaly or hepatomegaly. There is no tenderness.   Musculoskeletal: He exhibits no edema or tenderness.   Neurological: He is alert and oriented to person, place, and time.   Skin: Skin is warm and dry.       Results Reviewed:      ECG 12 Lead  Date/Time: 1/9/2018 2:12 PM  Performed by: MARIAM PEREZ  Authorized by: MARIAM PEREZ   Rhythm: sinus rhythm  Rate: normal  BPM: 76  Conduction: conduction normal  ST Segments: ST segments normal  T Waves: T waves normal  QRS axis: normal  Clinical impression: normal ECG        Labs reviewed  lk  Labs reviewed: CBC from 11/14/17 fairly unremarkable, as is CMP from the same date.  Lipid profile from that date shows TC: 161/LDL 86/HDL 38.      Old records reviewed:   Referral referral packet from primary provider reviewed, including last visit there from 11/29/17.  This mentions ordering a stress test, which was then performed on 12/11/17.  I reviewed this report, which states that the patient exercised 5:32 on Abimael protocol and was stopped for shortness of breath.  It is not clear to me from the records whether he reach target heart rate or not.  It is interpreted by Dr. Najera and notes \"test positive for ischemic ST-T wave changes.  No chest pain or discomfort.  Couple of PVCs with exercise.  Blood pressure response normal.  Normal left ventricular contractility resting/post exercise.  Recommend stress sestamibi.\"    We made " "multiple attempts to contact the referring facility to send the actual ECGs are reported as showing ischemic ST/T-wave changes, but as of yet been unable to get anything faxed other than repeated copies of the aforementioned report.        Assessment / Plan        Problem List Items Addressed This Visit        Cardiovascular and Mediastinum    Hypertension    Current Assessment & Plan     Hypertension is well controlled.  Continue current treatment regimen.  Blood pressure will be reassessed at the next regular appointment.         Relevant Orders    ECG 12 Lead    Hyperlipidemia    Current Assessment & Plan     LDL noted above, lipid abnormalities are currently well controlled on atorvastatin 40.             Respiratory    SYED (dyspnea on exertion) - Primary    Current Assessment & Plan     Given his tobacco abuse, this complaint could certainly be due to underlying lung disease.  Per his report, there has been no pulmonary investigation performed as of yet.  However, he was referred to me on the basis of an abnormal stress test.  If indeed the symptoms are cardiac, they are very stable as they have not changed over the course of 2 years.  I reviewed the stress test report as mentioned above; however, much more information is needed from this test before deciding how to act upon it.  First, we need to obtain the actual ECGs obtained throughout the test to personally reviewed them myself.  The reader mentions \"ischemic ST/T-wave changes\" without further description, then goes on to say echocardiogram showed normal wall motion response.  I would like to see the ECGs, and ideally obtain a disc with the actual echocardiographic images, for my own personal review.  -I also took the liberty of prescribing sublingual nitroglycerin today to be taken as needed with symptoms.  The patient will call us if this does help his symptoms, it which point I may consider prescribing 30 mg of Imdur daily in addition to a second " antianginal if indeed the stress test looks abnormal to me.   - If we cannot obtain the necessary ECGs and echocardiographic images from the stress test, we may need to repeat a stress test here, before committing to performing a cardiac catheterization.  -If images are obtained and I agree with the conclusion that they are abnormal, and the patient continues having symptoms on antianginal therapy that would be started as per above, then we will proceed with cardiac catheterization.            Other    Tobacco abuse        F/u 4 weeks    Thai Cueto MD   01/09/18   6:08 PM     ADDENDUM:  We obtained the actual ECGs performed during his stress test at [facility] on 12/11/17.  There were nonspecific ST abnormalities at baseline prior to exercise.  It appears as though peak exercise occurred at 5:33, and ECGs thereafter show nothing more than nonspecific ST abnormalities.  There are no ischemic changes noted per my interpretation.    Given these findings, I will discuss in greater detail with the patient at his follow-up appointment.  If he tells me at that time that his symptoms have responded at all to nitroglycerin, may then consider getting a nuclear perfusion stress test.  If his symptoms have no response to nitroglycerin, will likely conclude that no further cardiac testing or follow-up is needed and he should pursue a full pulmonary evaluation.    Thai Cueto MD  2:24 PM  01/12/18

## 2018-01-10 NOTE — ASSESSMENT & PLAN NOTE
"Given his tobacco abuse, this complaint could certainly be due to underlying lung disease.  Per his report, there has been no pulmonary investigation performed as of yet.  However, he was referred to me on the basis of an abnormal stress test.  If indeed the symptoms are cardiac, they are very stable as they have not changed over the course of 2 years.  I reviewed the stress test report as mentioned above; however, much more information is needed from this test before deciding how to act upon it.  First, we need to obtain the actual ECGs obtained throughout the test to personally reviewed them myself.  The reader mentions \"ischemic ST/T-wave changes\" without further description, then goes on to say echocardiogram showed normal wall motion response.  I would like to see the ECGs, and ideally obtain a disc with the actual echocardiographic images, for my own personal review.  -I also took the liberty of prescribing sublingual nitroglycerin today to be taken as needed with symptoms.  The patient will call us if this does help his symptoms, it which point I may consider prescribing 30 mg of Imdur daily in addition to a second antianginal if indeed the stress test looks abnormal to me.   - If we cannot obtain the necessary ECGs and echocardiographic images from the stress test, we may need to repeat a stress test here, before committing to performing a cardiac catheterization.  -If images are obtained and I agree with the conclusion that they are abnormal, and the patient continues having symptoms on antianginal therapy that would be started as per above, then we will proceed with cardiac catheterization.  "

## 2018-11-26 ENCOUNTER — TELEPHONE (OUTPATIENT)
Dept: VASCULAR SURGERY | Facility: CLINIC | Age: 58
End: 2018-11-26

## 2018-11-26 NOTE — TELEPHONE ENCOUNTER
SPOKE WITH PT REGARDING HIS [place] MEDICAID. I MADE SURE THE PT KNEW [place] HAS NOT CONTRACTED WITH ANYONE OUTSIDE THE STATE, AND WE WERE NOT ABLE TO ACCEPT THE INSURANCE RIGHT NOW. TOLD THE PT HE MAY WANT TO SPEAK WITH HIS PCP ABOUT FINDING A VASCULAR SURGEON IN [place] THAT WOULD ACCEPT HIS MEDICARE, OR, HE COULD STILL SEE US. HOWEVER, HE WOULD BE RESPONSIBLE % PAYMENT FOR THE VISIT, AND TESTING THAT WAS ORDERED AS WELL. I TOLD THE PT THAT IF HE DOES FIND A NEW VASCULAR SURGEON, WE WOULD BE MORE THAN HAPPY TO SEND ALL OF HIS RECORDS TO THE NEW PROVIDER.

## 2018-12-03 ENCOUNTER — APPOINTMENT (OUTPATIENT)
Dept: ULTRASOUND IMAGING | Facility: HOSPITAL | Age: 58
End: 2018-12-03

## 2019-01-04 ENCOUNTER — HOSPITAL ENCOUNTER (OUTPATIENT)
Dept: HOSPITAL 58 - LAB | Age: 59
Discharge: HOME | End: 2019-01-04
Attending: NURSE PRACTITIONER

## 2019-01-04 VITALS — BODY MASS INDEX: 26.6 KG/M2

## 2019-01-04 DIAGNOSIS — F41.9: ICD-10-CM

## 2019-01-04 DIAGNOSIS — I10: Primary | ICD-10-CM

## 2019-01-04 DIAGNOSIS — Z12.5: ICD-10-CM

## 2019-01-04 DIAGNOSIS — F32.9: ICD-10-CM

## 2019-01-04 PROCEDURE — 84443 ASSAY THYROID STIM HORMONE: CPT

## 2019-01-04 PROCEDURE — 80053 COMPREHEN METABOLIC PANEL: CPT

## 2019-01-04 PROCEDURE — 85025 COMPLETE CBC W/AUTO DIFF WBC: CPT

## 2019-01-04 PROCEDURE — 80061 LIPID PANEL: CPT

## 2019-01-04 PROCEDURE — 36415 COLL VENOUS BLD VENIPUNCTURE: CPT

## 2019-04-23 ENCOUNTER — HOSPITAL ENCOUNTER (OUTPATIENT)
Dept: HOSPITAL 58 - RHC-LAB | Age: 59
Discharge: HOME | End: 2019-04-23
Attending: NURSE PRACTITIONER

## 2019-04-23 VITALS — BODY MASS INDEX: 26.6 KG/M2

## 2019-04-23 DIAGNOSIS — R79.89: ICD-10-CM

## 2019-04-23 DIAGNOSIS — E78.2: Primary | ICD-10-CM

## 2019-04-23 PROCEDURE — 36415 COLL VENOUS BLD VENIPUNCTURE: CPT

## 2019-04-23 PROCEDURE — 80053 COMPREHEN METABOLIC PANEL: CPT

## 2019-04-23 PROCEDURE — 80061 LIPID PANEL: CPT

## 2019-04-23 PROCEDURE — 84443 ASSAY THYROID STIM HORMONE: CPT
